# Patient Record
Sex: MALE | Race: BLACK OR AFRICAN AMERICAN
[De-identification: names, ages, dates, MRNs, and addresses within clinical notes are randomized per-mention and may not be internally consistent; named-entity substitution may affect disease eponyms.]

---

## 2017-06-19 ENCOUNTER — HOSPITAL ENCOUNTER (OUTPATIENT)
Dept: HOSPITAL 47 - RADNMMAIN | Age: 42
Discharge: HOME | End: 2017-06-19
Attending: PHYSICIAN ASSISTANT
Payer: COMMERCIAL

## 2017-06-19 DIAGNOSIS — R11.2: Primary | ICD-10-CM

## 2017-06-19 PROCEDURE — 78264 GASTRIC EMPTYING IMG STUDY: CPT

## 2017-06-19 NOTE — NM
EXAMINATION TYPE: NM gastric emptying study

 

DATE OF EXAM: 6/19/2017

 

COMPARISON: NONE

 

HISTORY: Nausea with vomiting per order. Additional symptoms of epigastric pain, diminished appetite,
 and heartburn and reflux-like symptoms per patient.

 

Following administration of 2.2 mCi Tc 99m Sulfur Colloid with 1 cup of oatmeal projection images of 
the abdomen were obtained 10 minutes post ingestion. When possible, both anterior and posterior proje
ction images were obtained to allow the calculation of the geometric mean activity.

 

Clearance: 79 % at 60 minutes

 

Half-life: 10 min 

 

Reflux screen:  None

 

 

IMPRESSION: No gastroparesis. Abnormally fast emptying is actually noted. 

 

Gastric emptying normal percentage values:

30 minutes: <70% of retention (> 30% emptying) suggests abnormally fast emptying.

60 minutes: <90% retention (>10% emptying) is normal; less than 30% retention (>70% emptying) suggest
s abnormally rapid emptying.

90 minutes: <65% retention (> 35% emptying) is normal.

120 minutes: <60% retention (> 40% emptying) is normal.

180 minutes: <30% retention (> 70% emptying) is normal.

 

Gastric emptying T-1/2:

Solid: The normal range is  minutes

Liquid only: Normal range is 10-45 minutes.

Liquid only-children: At 60 minutes, normal range is 44-58 % .

Liquid only-infants: At 60 minutes, normal range is 32-64 %.

 

Additional references:

Gastric Emptying Scintigraphy [email or web address]://[email or web address]

## 2018-04-04 ENCOUNTER — HOSPITAL ENCOUNTER (OUTPATIENT)
Dept: HOSPITAL 47 - RADUSWWP | Age: 43
Discharge: HOME | End: 2018-04-04
Payer: COMMERCIAL

## 2018-04-04 DIAGNOSIS — E87.1: Primary | ICD-10-CM

## 2018-04-04 PROCEDURE — 76770 US EXAM ABDO BACK WALL COMP: CPT

## 2018-04-04 NOTE — US
EXAMINATION TYPE: US kidneys/renal and bladder

 

DATE OF EXAM: 4/4/2018

 

COMPARISON: NONE

 

CLINICAL HISTORY: E87.1 Hypotremia.

 

EXAM MEASUREMENTS:

 

Right Kidney:  10.2 x 5.5 x 9.7 cm

Left Kidney: 9.7 x 5.1 x 4.8 cm

Post Void Residual Volume:  1.1 mL

 

Limited due to bowel gas

 

Right Kidney: appears wnl  

Left Kidney: appears wnl  

Bladder: ?thickened bladder wall

**Bilateral Jets seen: No

**Normal Post Void Residual: Yes

 

 

 

IMPRESSION:

 

1. No acute abnormality.

2. Possible thickening of the urinary bladder wall. This could be related to incomplete distention.

## 2018-04-05 ENCOUNTER — HOSPITAL ENCOUNTER (OUTPATIENT)
Dept: HOSPITAL 47 - ORWHC2ENDO | Age: 43
Discharge: HOME | End: 2018-04-05
Payer: COMMERCIAL

## 2018-04-05 VITALS — DIASTOLIC BLOOD PRESSURE: 65 MMHG | SYSTOLIC BLOOD PRESSURE: 111 MMHG | HEART RATE: 92 BPM

## 2018-04-05 VITALS — TEMPERATURE: 97.1 F

## 2018-04-05 VITALS — BODY MASS INDEX: 18 KG/M2

## 2018-04-05 VITALS — RESPIRATION RATE: 16 BRPM

## 2018-04-05 DIAGNOSIS — R56.9: ICD-10-CM

## 2018-04-05 DIAGNOSIS — D57.1: ICD-10-CM

## 2018-04-05 DIAGNOSIS — I10: ICD-10-CM

## 2018-04-05 DIAGNOSIS — M10.9: ICD-10-CM

## 2018-04-05 DIAGNOSIS — F41.9: ICD-10-CM

## 2018-04-05 DIAGNOSIS — Z79.891: ICD-10-CM

## 2018-04-05 DIAGNOSIS — K29.50: Primary | ICD-10-CM

## 2018-04-05 DIAGNOSIS — F32.9: ICD-10-CM

## 2018-04-05 DIAGNOSIS — E11.65: ICD-10-CM

## 2018-04-05 DIAGNOSIS — Z98.1: ICD-10-CM

## 2018-04-05 DIAGNOSIS — K44.9: ICD-10-CM

## 2018-04-05 DIAGNOSIS — F17.200: ICD-10-CM

## 2018-04-05 DIAGNOSIS — Z79.4: ICD-10-CM

## 2018-04-05 DIAGNOSIS — K21.9: ICD-10-CM

## 2018-04-05 DIAGNOSIS — F10.10: ICD-10-CM

## 2018-04-05 DIAGNOSIS — Z79.899: ICD-10-CM

## 2018-04-05 LAB
GLUCOSE BLD-MCNC: 116 MG/DL (ref 75–99)
GLUCOSE BLD-MCNC: 157 MG/DL (ref 75–99)
GLUCOSE BLD-MCNC: 29 MG/DL (ref 75–99)
GLUCOSE BLD-MCNC: 32 MG/DL (ref 75–99)

## 2018-04-05 PROCEDURE — 43239 EGD BIOPSY SINGLE/MULTIPLE: CPT

## 2018-04-05 PROCEDURE — 45380 COLONOSCOPY AND BIOPSY: CPT

## 2018-04-05 PROCEDURE — 88305 TISSUE EXAM BY PATHOLOGIST: CPT

## 2018-04-05 NOTE — P.PCN
Date of Procedure: 04/05/18


Procedure(s) Performed: 


Procedures: 1. Esophagogastroduodenoscopy and biopsy.  2. Colonoscopy and 

biopsy.





Preoperative diagnosis: Abdominal pain and change in bowel habits.





Postoperative diagnosis: 1. Small sliding hiatal hernia with no obvious 

esophagitis or complicated reflux disease.  2. Mild antral gastritis.  3. Poor 

colon preparation, otherwise, exam to the cecum showed no obvious 

abnormalities.  4. Biopsies obtained from the duodenum, antrum, esophagus and 

right colon.





Preparation: HalfLytely prep.





Sedation: Was provided by anesthesia.





Brief clinical history: The patient is a 42-year-old male who was evaluated in 

the office last month for abdominal pain and change in bowel habits.  His pain 

has been mostly in the epigastric area, intermittent for more than one year.  

It occurs after meals worse with larger or greasy food.  For the last 5 months 

he has been having 3-4 bowel movement daily and to him it appears oily.  A 72-

hour stool study for fecal fat was within normal limits.  The patient has been 

on omeprazole 20 mg daily.  He has no heartburn or passive regurgitation on 

this treatment but still have occasional nausea.  No more vomiting.  He reports 

feeling full quickly.  He was diagnosed with diabetes mellitus in July 2016 and 

lost 32 pounds in 6 months at that time and his weight has been stable for the 

last year.  His diabetes is not well controlled.  This evaluation is to assess 

for peptic ulcer disease complicated reflux disease or colonic pathology.





Procedure: With the patient on his left lateral decubitus position and after 

informed consent and adequate sedation, I passed the Olympus- video 

upper endoscope through the cricopharyngeus down the esophagus.  GE junction 

was around 40-41 cm from the incisors and there was a small sliding hiatal 

hernia but no obvious esophagitis or complicated reflux.  The endoscope was 

then passed into the stomach which was insufflated with air and inspected in 

detail including the retroflex view in the cardia.  There was some mottling and 

erythema in the antrum but no ulcers or erosions.  There were no phytobezoar or 

other evidence of gastroparesis. Pyloric channel, duodenal bulb, post bulbar 

area and descending duodenum appeared within normal limits.  Because of his 

symptoms, I obtained biopsies from the duodenum, antrum and esophagus then the 

endoscope was withdrawn and I proceeded with the colonoscopy.





Perianal area did not show any fissures or fistulas.  There were no masses felt 

on digital rectal examination.  The Olympus CFQ 160L video colonoscope was then 

inserted in the rectum in the usual fashion and advanced to the cecum.  

Unfortunately, the preparation was extremely poor.  I did not see any large 

polyps or tumors or any obstruction. Where visualized, the mucosa appeared 

healthy.  Because of his poor preparation I was not able to intubate the 

ileocecal valve.  I obtained biopsies from the right colon then the endoscope 

was withdrawn.





The patient tolerated the procedure well.





Plan I summarized the findings to the patient.  Will await biopsy results.  He 

will follow up in our office and with you as planned and we would keep you 

updated on his progress.

## 2018-04-06 ENCOUNTER — HOSPITAL ENCOUNTER (OUTPATIENT)
Dept: HOSPITAL 47 - RADMRIMAIN | Age: 43
Discharge: HOME | End: 2018-04-06
Payer: COMMERCIAL

## 2018-04-06 DIAGNOSIS — H74.8X2: Primary | ICD-10-CM

## 2018-04-06 PROCEDURE — 70553 MRI BRAIN STEM W/O & W/DYE: CPT

## 2018-04-06 NOTE — MR
EXAMINATION TYPE: MR brain wo/w con

 

DATE OF EXAM: 4/6/2018

 

COMPARISON: NONE

 

HISTORY: Tinnitus, left ear

 

CONTRAST:  Performed utilizing 5 mL intravenous Gadavist gadolinium contrast.  

 

TECHNIQUE: Multiplanar, multiecho imaging on a 3.0 Devi magnet is performed through the brain.  Stud
y is performed within 24 hours of arrival to the hospital.

 

The craniovertebral junction is normal.  The pituitary is normal.  

 

Diffusion-weighted imaging is performed.  No abnormal hyperintensity is present to suggest an acute i
ntracranial infarct or acute ischemic change.

 

There are couple punctate subcortical white matter changes near the left centrum semiovale vertex. Th
evan are nonspecific and not out of proportion to the patient age.

 

No abnormal enhancement is evident.

 

Ventricles and sulci are appropriate for the patient age.

There are fluid-filled left mastoid air cells. Correlate for acute left mastoiditis. Some mild mucosa
l thickening is within ethmoid air cells.

 

IMPRESSIONS:

1. Normal pre and postcontrast intracranial MRI brain.

2. Fluid within the left mastoid air cells. Correlate for acute left mastoiditis.

## 2018-04-24 ENCOUNTER — HOSPITAL ENCOUNTER (OUTPATIENT)
Dept: HOSPITAL 47 - RADCTMAIN | Age: 43
Discharge: HOME | End: 2018-04-24
Payer: COMMERCIAL

## 2018-04-24 DIAGNOSIS — R63.4: ICD-10-CM

## 2018-04-24 DIAGNOSIS — R19.5: ICD-10-CM

## 2018-04-24 DIAGNOSIS — M79.672: Primary | ICD-10-CM

## 2018-04-24 LAB — BUN SERPL-SCNC: 10 MG/DL (ref 9–20)

## 2018-04-24 PROCEDURE — 84520 ASSAY OF UREA NITROGEN: CPT

## 2018-04-24 PROCEDURE — 36415 COLL VENOUS BLD VENIPUNCTURE: CPT

## 2018-04-24 PROCEDURE — 74177 CT ABD & PELVIS W/CONTRAST: CPT

## 2018-04-24 PROCEDURE — 82565 ASSAY OF CREATININE: CPT

## 2018-04-24 PROCEDURE — 73630 X-RAY EXAM OF FOOT: CPT

## 2018-04-24 NOTE — CT
EXAMINATION TYPE: CT abdomen pelvis w con

 

DATE OF EXAM: 4/24/2018

 

COMPARISON: NONE

 

HISTORY: Abnormal weight loss per order and patient (R 63.4).

 

CT DLP: 351.10 mGycm, Automated Exposure Control for Dose Reduction was Utilized.

 

CONTRAST: 

CT scan of the abdomen and pelvis is performed with oral and with IV Contrast, patient injected with 
100 ml mL of Isovue 300.

 

FINDINGS: Patient has very little intra-abdominal fat making evaluation suboptimal.

 

LUNG BASES:  No significant abnormality is appreciated.

 

LIVER/GB:   No significant abnormality is appreciated.

 

PANCREAS: Pancreas is small in size with calcifications throughout its entire course. CT findings are
 consistent with product of chronic pancreatitis.

 

SPLEEN:  No significant abnormality is seen.

 

ADRENALS:  No significant abnormality is seen.

 

KIDNEYS: There is symmetric cortical medullary uptake and excretion from both kidneys without evidenc
e of concerning renal mass or hydronephrosis bilaterally. A few scattered pelvic phleboliths are pres
ent.

 

BOWEL: Oral contrast does not reach colonic level making evaluation slightly submitted optimal. There
 is no suspicious dilatation of stomach or small bowel loops. There is small bowel feces sign in the 
terminal ileum consistent with delayed passage of ingested material 2 colonic level. There is promine
nce of fecal material throughout the entire visualized colon into rectum.

 

PROSTATE/SEMINAL VESICLES:  No gross abnormality seen.

 

LYMPH NODES:  No greater than 1cm abdominal or pelvic lymph nodes are appreciated.

 

OSSEOUS STRUCTURES:  No significant abnormality is seen.

 

OTHER: There is mild to moderate calcified plaque of aorta extending into branch vessels.

 

IMPRESSION:

1. No worrisome mass or adenopathy is seen.

2. Overall nonobstructive bowel gas pattern. Fairly moderate to severe diffuse colonic fecal stasis o
r constipation is noted. CT findings consistent with chronic pancreatitis are present.

## 2018-04-24 NOTE — XR
EXAMINATION TYPE: XR foot complete LT

 

DATE OF EXAM: 4/24/2018

 

CLINICAL HISTORY: pain

 

TECHNIQUE: Frontal, lateral and oblique images of the left foot are obtained.

 

COMPARISON: None.

 

FINDINGS:  Bunionectomy changes first metatarsal. There is no acute fracture/dislocation evident. The
 joint spaces  appear within normal limits.  The overlying soft tissue appears unremarkable.

 

IMPRESSION:  

There is no acute fracture or dislocation.

 

ICD 10 NO FRACTURE, INITIAL EVALUATION

## 2019-01-14 ENCOUNTER — HOSPITAL ENCOUNTER (OUTPATIENT)
Dept: HOSPITAL 47 - EC | Age: 44
Setting detail: OBSERVATION
LOS: 2 days | Discharge: HOME | End: 2019-01-16
Attending: INTERNAL MEDICINE | Admitting: INTERNAL MEDICINE
Payer: COMMERCIAL

## 2019-01-14 VITALS — BODY MASS INDEX: 17.9 KG/M2

## 2019-01-14 DIAGNOSIS — Z98.1: ICD-10-CM

## 2019-01-14 DIAGNOSIS — E78.5: ICD-10-CM

## 2019-01-14 DIAGNOSIS — M10.9: ICD-10-CM

## 2019-01-14 DIAGNOSIS — K92.0: ICD-10-CM

## 2019-01-14 DIAGNOSIS — K56.7: Primary | ICD-10-CM

## 2019-01-14 DIAGNOSIS — Z96.41: ICD-10-CM

## 2019-01-14 DIAGNOSIS — E10.42: ICD-10-CM

## 2019-01-14 DIAGNOSIS — Z87.19: ICD-10-CM

## 2019-01-14 DIAGNOSIS — K76.0: ICD-10-CM

## 2019-01-14 DIAGNOSIS — E87.1: ICD-10-CM

## 2019-01-14 DIAGNOSIS — Z23: ICD-10-CM

## 2019-01-14 DIAGNOSIS — K21.9: ICD-10-CM

## 2019-01-14 DIAGNOSIS — D72.829: ICD-10-CM

## 2019-01-14 DIAGNOSIS — G40.909: ICD-10-CM

## 2019-01-14 DIAGNOSIS — Z79.1: ICD-10-CM

## 2019-01-14 DIAGNOSIS — M54.5: ICD-10-CM

## 2019-01-14 DIAGNOSIS — I10: ICD-10-CM

## 2019-01-14 DIAGNOSIS — Z86.61: ICD-10-CM

## 2019-01-14 DIAGNOSIS — Z72.89: ICD-10-CM

## 2019-01-14 DIAGNOSIS — D57.3: ICD-10-CM

## 2019-01-14 DIAGNOSIS — Z79.4: ICD-10-CM

## 2019-01-14 DIAGNOSIS — Z82.49: ICD-10-CM

## 2019-01-14 DIAGNOSIS — K86.1: ICD-10-CM

## 2019-01-14 DIAGNOSIS — Z79.899: ICD-10-CM

## 2019-01-14 DIAGNOSIS — E86.0: ICD-10-CM

## 2019-01-14 DIAGNOSIS — F17.210: ICD-10-CM

## 2019-01-14 DIAGNOSIS — R19.5: ICD-10-CM

## 2019-01-14 PROCEDURE — 85025 COMPLETE CBC W/AUTO DIFF WBC: CPT

## 2019-01-14 PROCEDURE — 74177 CT ABD & PELVIS W/CONTRAST: CPT

## 2019-01-14 PROCEDURE — 74018 RADEX ABDOMEN 1 VIEW: CPT

## 2019-01-14 PROCEDURE — 96374 THER/PROPH/DIAG INJ IV PUSH: CPT

## 2019-01-14 PROCEDURE — 82553 CREATINE MB FRACTION: CPT

## 2019-01-14 PROCEDURE — 99285 EMERGENCY DEPT VISIT HI MDM: CPT

## 2019-01-14 PROCEDURE — 83690 ASSAY OF LIPASE: CPT

## 2019-01-14 PROCEDURE — 36415 COLL VENOUS BLD VENIPUNCTURE: CPT

## 2019-01-14 PROCEDURE — 84484 ASSAY OF TROPONIN QUANT: CPT

## 2019-01-14 PROCEDURE — 85610 PROTHROMBIN TIME: CPT

## 2019-01-14 PROCEDURE — 82272 OCCULT BLD FECES 1-3 TESTS: CPT

## 2019-01-14 PROCEDURE — 82550 ASSAY OF CK (CPK): CPT

## 2019-01-14 PROCEDURE — 90686 IIV4 VACC NO PRSV 0.5 ML IM: CPT

## 2019-01-14 PROCEDURE — 82150 ASSAY OF AMYLASE: CPT

## 2019-01-14 PROCEDURE — 96361 HYDRATE IV INFUSION ADD-ON: CPT

## 2019-01-14 PROCEDURE — 81003 URINALYSIS AUTO W/O SCOPE: CPT

## 2019-01-14 PROCEDURE — 96376 TX/PRO/DX INJ SAME DRUG ADON: CPT

## 2019-01-14 PROCEDURE — 80053 COMPREHEN METABOLIC PANEL: CPT

## 2019-01-14 PROCEDURE — 86901 BLOOD TYPING SEROLOGIC RH(D): CPT

## 2019-01-14 PROCEDURE — 86900 BLOOD TYPING SEROLOGIC ABO: CPT

## 2019-01-14 PROCEDURE — 83605 ASSAY OF LACTIC ACID: CPT

## 2019-01-14 PROCEDURE — 83036 HEMOGLOBIN GLYCOSYLATED A1C: CPT

## 2019-01-14 PROCEDURE — 85730 THROMBOPLASTIN TIME PARTIAL: CPT

## 2019-01-14 PROCEDURE — 90732 PPSV23 VACC 2 YRS+ SUBQ/IM: CPT

## 2019-01-14 PROCEDURE — 86850 RBC ANTIBODY SCREEN: CPT

## 2019-01-14 PROCEDURE — 96375 TX/PRO/DX INJ NEW DRUG ADDON: CPT

## 2019-01-15 LAB
ALBUMIN SERPL-MCNC: 5.3 G/DL (ref 3.5–5)
ALP SERPL-CCNC: 113 U/L (ref 38–126)
ALT SERPL-CCNC: 74 U/L (ref 21–72)
AMYLASE SERPL-CCNC: 81 U/L (ref 30–110)
ANION GAP SERPL CALC-SCNC: 18 MMOL/L
APTT BLD: 22.3 SEC (ref 22–30)
AST SERPL-CCNC: 105 U/L (ref 17–59)
BASOPHILS # BLD AUTO: 0.1 K/UL (ref 0–0.2)
BASOPHILS NFR BLD AUTO: 1 %
BUN SERPL-SCNC: 22 MG/DL (ref 9–20)
CALCIUM SPEC-MCNC: 11 MG/DL (ref 8.4–10.2)
CHLORIDE SERPL-SCNC: 92 MMOL/L (ref 98–107)
CK SERPL-CCNC: 56 U/L (ref 55–170)
CO2 SERPL-SCNC: 25 MMOL/L (ref 22–30)
EOSINOPHIL # BLD AUTO: 0.4 K/UL (ref 0–0.7)
EOSINOPHIL NFR BLD AUTO: 4 %
ERYTHROCYTE [DISTWIDTH] IN BLOOD BY AUTOMATED COUNT: 4.24 M/UL (ref 4.3–5.9)
ERYTHROCYTE [DISTWIDTH] IN BLOOD: 12.8 % (ref 11.5–15.5)
GLUCOSE BLD-MCNC: 156 MG/DL (ref 75–99)
GLUCOSE BLD-MCNC: 171 MG/DL (ref 75–99)
GLUCOSE BLD-MCNC: 172 MG/DL (ref 75–99)
GLUCOSE SERPL-MCNC: 215 MG/DL (ref 74–99)
HCT VFR BLD AUTO: 38.4 % (ref 39–53)
HGB BLD-MCNC: 12.6 GM/DL (ref 13–17.5)
INR PPP: 1.1 (ref ?–1.2)
LIPASE SERPL-CCNC: 10 U/L (ref 23–300)
LYMPHOCYTES # SPEC AUTO: 3.7 K/UL (ref 1–4.8)
LYMPHOCYTES NFR SPEC AUTO: 33 %
MCH RBC QN AUTO: 29.7 PG (ref 25–35)
MCHC RBC AUTO-ENTMCNC: 32.8 G/DL (ref 31–37)
MCV RBC AUTO: 90.5 FL (ref 80–100)
MONOCYTES # BLD AUTO: 0.8 K/UL (ref 0–1)
MONOCYTES NFR BLD AUTO: 7 %
NEUTROPHILS # BLD AUTO: 6.1 K/UL (ref 1.3–7.7)
NEUTROPHILS NFR BLD AUTO: 54 %
PH UR: 5 [PH] (ref 5–8)
PLATELET # BLD AUTO: 193 K/UL (ref 150–450)
POTASSIUM SERPL-SCNC: 4 MMOL/L (ref 3.5–5.1)
PROT SERPL-MCNC: 9.1 G/DL (ref 6.3–8.2)
PT BLD: 11.3 SEC (ref 9–12)
SODIUM SERPL-SCNC: 135 MMOL/L (ref 137–145)
SP GR UR: 1.01 (ref 1–1.03)
TROPONIN I SERPL-MCNC: <0.012 NG/ML (ref 0–0.03)
UROBILINOGEN UR QL STRIP: <2 MG/DL (ref ?–2)
WBC # BLD AUTO: 11.4 K/UL (ref 3.8–10.6)

## 2019-01-15 RX ADMIN — FAMOTIDINE SCH MG: 20 TABLET, FILM COATED ORAL at 11:17

## 2019-01-15 RX ADMIN — FAMOTIDINE SCH MG: 20 TABLET, FILM COATED ORAL at 22:20

## 2019-01-15 RX ADMIN — MORPHINE SULFATE PRN MG: 4 INJECTION, SOLUTION INTRAMUSCULAR; INTRAVENOUS at 11:16

## 2019-01-15 RX ADMIN — CEFAZOLIN SCH MLS/HR: 330 INJECTION, POWDER, FOR SOLUTION INTRAMUSCULAR; INTRAVENOUS at 14:19

## 2019-01-15 RX ADMIN — LISINOPRIL SCH MG: 10 TABLET ORAL at 11:21

## 2019-01-15 RX ADMIN — MORPHINE SULFATE PRN MG: 4 INJECTION, SOLUTION INTRAMUSCULAR; INTRAVENOUS at 22:21

## 2019-01-15 NOTE — CT
EXAMINATION TYPE: CT abdomen pelvis w con

 

DATE OF EXAM: 1/15/2019

 

COMPARISON: 4/24/2018

 

HISTORY: abd pain

 

CT DLP: 416.7 mGycm

Automated exposure control for dose reduction was used.

 

TECHNIQUE:  Helical acquisition of images was performed from the lung bases through the pelvis.

 

CONTRAST: 

Performed without Oral Contrast and with IV Contrast, patient injected with 100 mL of Isovue 300.

 

FINDINGS: 

 

Lung bases are clear. There is no pleural effusion. Heart appears normal. There is no pericardial eff
usion. There is small hiatal hernia. Liver shows no focal defect. Gallbladder appears normal. Bile du
cts are not dilated. There is no evidence of splenic mass. There is extensive carotid calcification. 
There is no evidence of pancreatic mass.

 

There is no adrenal mass. Kidneys show satisfactory contrast opacification. There is no hydronephrosi
s. There is no retroperitoneal adenopathy. There is no free fluid in the pelvis. Bladder distends smo
othly. There is no inguinal hernia. There is no sign of a pelvic mass. Abdominal aorta is atheromatou
s. There are some fluid-filled loops of small bowel that measure up to 3 cm. There is no evidence of 
mesenteric edema. There is no sign of free air. I see no intestinal wall thickening. Appendix is not 
seen. There is no sign of appendicitis.

LUMBAR VERTEBRA HAVE NORMAL SPACING AND ALIGNMENT. THERE IS NO COMPRESSION FRACTURE.

 

IMPRESSION:

THERE ARE A FEW SMALL BOWEL LOOPS DISTENDED WITH FLUID IN THE LEFT UPPER QUADRANT CONSISTENT WITH ILE
US. I SEE NO TRANSITION POINT. THIS APPEARS NEW COMPARED TO OLD EXAM. THERE IS CLEARING OF THE CONSTI
PATION COMPARED TO OLD EXAM.

 

EXTENSIVE PANCREATIC CALCIFICATION CONSISTENT WITH CHRONIC PANCREATITIS UNCHANGED.

## 2019-01-15 NOTE — P.GSCN
History of Present Illness


Consult date: 01/15/19


Reason for Consult: 





abdominal pain





Requesting physician: Raciel E Jonathan


History of present illness: 





CHIEF COMPLAINT: Abdominal pain.  Ileus





HISTORY OF PRESENT ILLNESS: 43-year-old -American male who presented to 

the emergency room due to abdominal pain.  Reports the pain was near the 

epigastric region and became so severe he presented to the ER. However, at the 

time of exam patient denies any pain. States he feels well and wants something 

to eat. Patient reports black stools over the past few days, but also reports 

taking Pepto Bismol. He currently denies nausea or vomiting. 





IMAGIN. CT Abdomen and pelvis completed in the emergency room reveals a few small 

bowel loops distended with fluid in the left upper quadrant consistent with 

ileus.  This appears new compared to old exam.  There is clearing of the 

constipation compared to old exam.  Extensive pancreatic calcification 

consistent with chronic pancreatitis unchanged.


2. KUB X-Ray: Nonacute abdomen





PAST MEDICAL HISTORY: 


See list.





PAST SURGICAL HISTORY: 


See list.





MEDICATIONS: 


See list.





ALLERGIES: 


See list.





SOCIAL HISTORY: Reports occasional marijuana use.  Daily cigarette smoker.  

Daily alcohol use.





REVIEW OF ORGAN SYSTEMS: 


CONSTITUTIONAL: Denies fever or chills.


HEENT: No troubles with vision or hearing. No reports of dysphagia.  


ENDOCRINE: No reports of thyroid disorders. Reports history of diabetes.


CARDIOVASCULAR: No heart attack. No chest pain. 


RESPIRATORY: No shortness of breath or pneumonia.


GASTROINTESTINAL: Reports abdominal pain prior to admission.  Reports black 

stools.


NEURO: No reports of stroke or seizure disorders. 


PSYCH: No depression or suicidal ideation


HEMATOLOGIC: No easy bruising or bleeding


LYMPHATIC:  The patient denies any lumps and bumps around the neck. 


GENITOURINARY:  Denies any blood in urine or increased urinary frequency.  


MUSCULOSKELETAL:  Denies back pain, stiffness or joint arthritis. 


SKIN: Denies history or rash or cellulitis.





PHYSICAL EXAM: 


VITAL SIGNS: Currently stable.


GENERAL: Well-developed in no acute distress. 


HEENT:  No sclera icterus. Extraocular movements grossly intact.  Moist buccal 

mucosa. 


Head is atraumatic, normocephalic. Hears conversational speech. No nasal 

drainage.


NECK:  Supple without lymphadenopathy.


CHEST:  Non-labored respirations and equal bilateral excursions. 


CARDIOVASCULAR:  Regular rate with regular rhythm.  Palpable 2+ radial pulses.


ABDOMEN:  Soft.  Nondistended.  No peritonitis. Mild epigastric tenderness upon 

palpation. 


MUSCULOSKELETAL:  No clubbing, cyanosis or edema.


NEUROLOGIC:  No focal or lateralizing signs.  Cranial nerves II through XII 

grossly intact.


PSYCH: Alert and oriented x  3


SKIN: Well perfused.  Good skin turgor. 





ASSESSMENT: 


1. Abdominal pain, CT reveals a few small bowel loops distended with fluid in 

the left upper quadrant consistent with ileus


2. Ileus


3. Dark stools, patient reports taking pepto bismol at home, stool for occult 

blood negative, r/o GI bleeding


4. Chronic pancreatitis


5. Daily alcohol use


6. Leukocytosis


7. Transaminitis





PLAN: 


May begin clear liquid diet. No surgical intervention at this time. 





Nurse practitioner note has been reviewed by physician. Signing provider agrees 

with the documented findings, assessment, and plan of care. 











Past Medical History


Past Medical History: Diabetes Mellitus, GERD/Reflux, Hyperlipidemia, 

Hypertension, Liver Disease, Seizure Disorder


Additional Past Medical History / Comment(s): IDDM type I with insulin pump, DKA

, neuropathy bilateral feet, metabolic encephalitis post seizure, seizures-last 

one , fatty liver, occasional low back pain, gastritis, hiatal hernina, 

abdominal pain/vomiting and diarrhea at times, gout bilateral feet.


History of Any Multi-Drug Resistant Organisms: None Reported


Past Surgical History: Back Surgery


Additional Past Surgical History / Comment(s): EGDs/colonoscopies, low back 

spinal fusion, L toes with screws/bunionectomy.


Past Anesthesia/Blood Transfusion Reactions: Motion Sickness


Smoking Status: Current every day smoker





- Past Family History


  ** Father


Family Medical History: Unable to Obtain


Additional Family Medical History / Comment(s): Father was murdered in his 30's





  ** Mother


Family Medical History: No Reported History, Hypertension


Additional Family Medical History / Comment(s): Mother is 64 yrs old.  Mother 

is 64 yrs old





  ** Brother(s)


Family Medical History: Hypertension





  ** Sister(s)


Family Medical History: Hypertension





Medications and Allergies


 Home Medications











 Medication  Instructions  Recorded  Confirmed  Type


 


Folic Acid 1 mg PO DAILY 07/16/14 01/15/19 History


 


Lisinopril [Zestril] 10 mg PO DAILY 07/16/14 01/15/19 History


 


Thiamine [Vitamin B-1] 100 mg PO DAILY 07/16/14 01/15/19 History


 


Omeprazole [PriLOSEC] 20 mg PO AC-BRKFST 12/10/15 01/15/19 History


 


Hydrocodone/Acetaminophen [Norco 1 tab PO BID PRN 06/29/16 01/15/19 History





7.5-325]    


 


Lacosamide [Vimpat] 100 mg PO BID 06/29/16 01/15/19 History


 


Potassium Chloride ER [K-Dur 10] 10 meq PO DAILY 06/29/16 01/15/19 History


 


levETIRAcetam [Keppra] 500 mg PO BID 08/22/16 01/15/19 History


 


Magnesium Oxide [Mag-Ox] 400 mg PO BID 04/03/18 01/15/19 History


 


PARoxetine HCL [Paxil] 30 mg PO DAILY 04/03/18 01/15/19 History


 


Atorvastatin [Lipitor] 10 mg PO DAILY 01/15/19 01/15/19 History


 


INSULIN LISPRO (For Pump) [humaLOG 0.01 units SQ-PUMP CONTINUOUS 01/15/19 01/15/

19 History





(For Pump)]    


 


Lipase/Protease/Amylase [Creon Dr 24,000 units PO TID 01/15/19 01/15/19 History





12,000 Units Capsule]    


 


QUEtiapine [SEROquel] 100 mg PO HS 01/15/19 01/15/19 History


 


Vitamin D2  20,000 Units 20,000 units PO MO 01/15/19 01/15/19 History











 Allergies











Allergy/AdvReac Type Severity Reaction Status Date / Time


 


No Known Allergies Allergy   Verified 01/15/19 06:44














Surgical - Exam


 Vital Signs











Temp Pulse Resp BP Pulse Ox


 


 97.7 F   104 H  18   102/68   99 


 


 19 23:29  19 23:29  19 23:29  19 23:29  19 23:29














Results





- Labs





 01/15/19 00:26





 01/15/19 00:26


 Abnormal Lab Results - Last 24 Hours (Table)











  01/15/19 01/15/19 01/15/19 Range/Units





  00:26 00:26 00:26 


 


WBC   11.4 H   (3.8-10.6)  k/uL


 


RBC   4.24 L   (4.30-5.90)  m/uL


 


Hgb   12.6 L   (13.0-17.5)  gm/dL


 


Hct   38.4 L   (39.0-53.0)  %


 


Sodium  135 L    (137-145)  mmol/L


 


Chloride  92 L    ()  mmol/L


 


BUN  22 H    (9-20)  mg/dL


 


Glucose  215 H    (74-99)  mg/dL


 


POC Glucose (mg/dL)     (75-99)  mg/dL


 


Plasma Lactic Acid Jean-Claude    4.7 H*  (0.7-2.0)  mmol/L


 


Calcium  11.0 H    (8.4-10.2)  mg/dL


 


AST  105 H    (17-59)  U/L


 


ALT  74 H    (21-72)  U/L


 


Total Protein  9.1 H    (6.3-8.2)  g/dL


 


Albumin  5.3 H    (3.5-5.0)  g/dL


 


Lipase  10 L    ()  U/L














  01/15/19 Range/Units





  12:29 


 


WBC   (3.8-10.6)  k/uL


 


RBC   (4.30-5.90)  m/uL


 


Hgb   (13.0-17.5)  gm/dL


 


Hct   (39.0-53.0)  %


 


Sodium   (137-145)  mmol/L


 


Chloride   ()  mmol/L


 


BUN   (9-20)  mg/dL


 


Glucose   (74-99)  mg/dL


 


POC Glucose (mg/dL)  156 H  (75-99)  mg/dL


 


Plasma Lactic Acid Jean-Claude   (0.7-2.0)  mmol/L


 


Calcium   (8.4-10.2)  mg/dL


 


AST   (17-59)  U/L


 


ALT   (21-72)  U/L


 


Total Protein   (6.3-8.2)  g/dL


 


Albumin   (3.5-5.0)  g/dL


 


Lipase   ()  U/L








 Diabetes panel











  01/15/19 Range/Units





  00:26 


 


Sodium  135 L  (137-145)  mmol/L


 


Potassium  4.0  (3.5-5.1)  mmol/L


 


Chloride  92 L  ()  mmol/L


 


Carbon Dioxide  25  (22-30)  mmol/L


 


BUN  22 H  (9-20)  mg/dL


 


Creatinine  1.18  (0.66-1.25)  mg/dL


 


Glucose  215 H  (74-99)  mg/dL


 


Calcium  11.0 H  (8.4-10.2)  mg/dL


 


AST  105 H  (17-59)  U/L


 


ALT  74 H  (21-72)  U/L


 


Alkaline Phosphatase  113  ()  U/L


 


Total Protein  9.1 H  (6.3-8.2)  g/dL


 


Albumin  5.3 H  (3.5-5.0)  g/dL








 Calcium panel











  01/15/19 Range/Units





  00:26 


 


Calcium  11.0 H  (8.4-10.2)  mg/dL


 


Albumin  5.3 H  (3.5-5.0)  g/dL








 Pituitary panel











  01/15/19 Range/Units





  00:26 


 


Sodium  135 L  (137-145)  mmol/L


 


Potassium  4.0  (3.5-5.1)  mmol/L


 


Chloride  92 L  ()  mmol/L


 


Carbon Dioxide  25  (22-30)  mmol/L


 


BUN  22 H  (9-20)  mg/dL


 


Creatinine  1.18  (0.66-1.25)  mg/dL


 


Glucose  215 H  (74-99)  mg/dL


 


Calcium  11.0 H  (8.4-10.2)  mg/dL








 Adrenal panel











  01/15/19 Range/Units





  00:26 


 


Sodium  135 L  (137-145)  mmol/L


 


Potassium  4.0  (3.5-5.1)  mmol/L


 


Chloride  92 L  ()  mmol/L


 


Carbon Dioxide  25  (22-30)  mmol/L


 


BUN  22 H  (9-20)  mg/dL


 


Creatinine  1.18  (0.66-1.25)  mg/dL


 


Glucose  215 H  (74-99)  mg/dL


 


Calcium  11.0 H  (8.4-10.2)  mg/dL


 


Total Bilirubin  0.8  (0.2-1.3)  mg/dL


 


AST  105 H  (17-59)  U/L


 


ALT  74 H  (21-72)  U/L


 


Alkaline Phosphatase  113  ()  U/L


 


Total Protein  9.1 H  (6.3-8.2)  g/dL


 


Albumin  5.3 H  (3.5-5.0)  g/dL

## 2019-01-15 NOTE — ED
Abdominal Pain HPI





<Daksha Small CDE - Last Filed: 01/15/19 02:32>





- General


Source: patient, RN notes reviewed


Mode of arrival: ambulatory


Limitations: no limitations





- History of Present Illness


MD Complaint: abdominal pain, other





<Alex Dumas - Last Filed: 01/16/19 17:25>





- General


Chief Complaint: Abdominal Pain


Stated Complaint: Vomiting, Abd Pain


Time Seen by Provider: 01/14/19 23:33





- History of Present Illness


Initial Comments: 





This is a 43-year-old male who states he's had the onset of upper epigastric 

pain with coffee-ground emesis and black colored stools over last at least 

couple days.  He does admit that he had been taking Pepto-Bismol but he states 

coffee-ground emesis was both before and after taking Pepto-Bismol.  He states 

the pain is 10/10 achy in nature he's had no fevers chills nausea vomiting 

sweats he does have a history of fatty liver cancer pancreatic issues.  No 

prior abdominal surgeries he denies any recent alcohol a history of ulcers in 

the past a trauma any other symptoms at this time.  No prior history of GI 

bleed. (Alex Dumas)





- Related Data


 Home Medications











 Medication  Instructions  Recorded  Confirmed


 


Folic Acid 1 mg PO DAILY 07/16/14 01/15/19


 


Lisinopril [Zestril] 10 mg PO DAILY 07/16/14 01/15/19


 


Thiamine [Vitamin B-1] 100 mg PO DAILY 07/16/14 01/15/19


 


Hydrocodone/Acetaminophen [Norco 1 tab PO BID PRN 06/29/16 01/15/19





7.5-325]   


 


Lacosamide [Vimpat] 100 mg PO BID 06/29/16 01/15/19


 


Potassium Chloride ER [K-Dur 10] 10 meq PO DAILY 06/29/16 01/15/19


 


levETIRAcetam [Keppra] 500 mg PO BID 08/22/16 01/15/19


 


Magnesium Oxide [Mag-Ox] 400 mg PO BID 04/03/18 01/15/19


 


PARoxetine HCL [Paxil] 30 mg PO DAILY 04/03/18 01/15/19


 


Atorvastatin [Lipitor] 10 mg PO DAILY 01/15/19 01/15/19


 


INSULIN LISPRO (For Pump) [humaLOG 0.01 units SQ-PUMP CONTINUOUS 01/15/19 01/15/

19





(For Pump)]   


 


Lipase/Protease/Amylase [Creon Dr 24,000 units PO TID 01/15/19 01/15/19





12,000 Units Capsule]   


 


QUEtiapine [SEROquel] 100 mg PO HS 01/15/19 01/15/19


 


Vitamin D2  20,000 Units 20,000 units PO MO 01/15/19 01/15/19








 Previous Rx's











 Medication  Instructions  Recorded


 


Mirtazapine [Remeron] 45 mg PO HS  tablet 01/16/19


 


Pantoprazole Sodium [Protonix] 40 mg PO DAILY #15 tablet. 01/16/19











 Allergies











Allergy/AdvReac Type Severity Reaction Status Date / Time


 


No Known Allergies Allergy   Verified 01/15/19 06:44














Review of Systems


ROS Other: All systems not noted in ROS Statement are negative.





<Daksha Small - Last Filed: 01/15/19 02:32>


ROS Other: All systems not noted in ROS Statement are negative.





<Alex Dumas - Last Filed: 01/16/19 17:25>


ROS Statement: 


Those systems with pertinent positive or pertinent negative responses have been 

documented in the HPI.








Past Medical History


Past Medical History: Diabetes Mellitus, GERD/Reflux, Hyperlipidemia, 

Hypertension, Seizure Disorder


Additional Past Medical History / Comment(s): past metabolic encephalopathy 

from seizure disorder, recurrent seizure disorder r/t missed meds, chronic 

alcohol usage, gout, hypoalbuminemia from alcoholism, HTN.  Other hx:  anemia 

with sickle cell "trait" per significant other, abdominal pain with vomiting 

and diarrhea over past 6 months-was to have upper and lower endoscopies but pt 

cancelled.ringing in his ears


History of Any Multi-Drug Resistant Organisms: None Reported


Past Surgical History: Back Surgery


Additional Past Surgical History / Comment(s): 2014 egd with bx, left toe 

surgery with 2 screws


Past Anesthesia/Blood Transfusion Reactions: No Reported Reaction


Past Psychological History: Anxiety, Depression


Smoking Status: Current every day smoker


Past Alcohol Use History: Occasional


Past Drug Use History: Marijuana





- Past Family History


  ** Father


Family Medical History: Unable to Obtain


Additional Family Medical History / Comment(s): Father was murdered in his 30's





  ** Mother


Family Medical History: No Reported History, Hypertension


Additional Family Medical History / Comment(s): Mother is 64 yrs old





  ** Brother(s)


Family Medical History: Hypertension





  ** Sister(s)


Family Medical History: Hypertension





<Alex Dumas - Last Filed: 01/16/19 17:25>





General Exam





<Daksha Small P - Last Filed: 01/15/19 02:32>


Limitations: no limitations


General appearance: alert, in no apparent distress


Head exam: Present: atraumatic, normocephalic, normal inspection


Eye exam: Present: normal appearance, PERRL, EOMI.  Absent: scleral icterus, 

conjunctival injection, periorbital swelling


ENT exam: Present: normal exam, mucous membranes moist


Neck exam: Present: normal inspection.  Absent: tenderness, meningismus, 

lymphadenopathy


Respiratory exam: Present: normal lung sounds bilaterally.  Absent: respiratory 

distress, wheezes, rales, rhonchi, stridor


Cardiovascular Exam: Present: regular rate, normal rhythm, normal heart sounds.

  Absent: systolic murmur, diastolic murmur, rubs, gallop, clicks


GI/Abdominal exam: Present: soft, tenderness (Tenderness palpation of the 

epigastrium no guarding rebound masses or bruits), normal bowel sounds.  Absent

: distended, guarding, rebound, rigid


Rectal exam: Present: normal inspection, other (Dark greenish black stool 

Hemoccult is pending)


Extremities exam: Present: normal inspection, full ROM, normal capillary 

refill.  Absent: tenderness, pedal edema, joint swelling, calf tenderness


Back exam: Present: normal inspection


Neurological exam: Present: alert, oriented X3, CN II-XII intact


Psychiatric exam: Present: normal affect, normal mood


Skin exam: Present: warm, dry, intact, normal color.  Absent: rash





<Alex Dumas - Last Filed: 01/16/19 17:25>





- General Exam Comments


Initial Comments: 





This a well-developed sec appearing male who is awake alert oriented 3 (Alex Dumas)





Course





<Daksha Small - Last Filed: 01/15/19 02:32>





<Alex Dumas - Last Filed: 01/16/19 17:25>


 Vital Signs











  01/14/19 01/15/19 01/15/19





  23:29 02:29 06:36


 


Temperature 97.7 F  


 


Pulse Rate 104 H 72 78


 


Respiratory 18 16 16





Rate   


 


Blood Pressure 102/68 106/66 100/71


 


O2 Sat by Pulse 99 100 100





Oximetry   














- Reevaluation(s)


Reevaluation #1: 





01/15/19 01:00


The case is endorsed to Dr. Small at shift change (Alex Dumas)





Medical Decision Making





- Lab Data


Result diagrams: 


 01/15/19 00:26





 01/15/19 00:26





<Daksha Small - Last Filed: 01/15/19 02:32>





- Lab Data


Result diagrams: 


 01/16/19 09:35





 01/16/19 09:35





<Alex Dumas - Last Filed: 01/16/19 17:25>





- Medical Decision Making


Patient care was signed out to me by Dr. Dumas at shift change.  At that time 

labs are pending.  Patient's labs resulted with a critical lactic acidosis of 

4.7.  Additional IV fluid rehydration was ordered as well as a computed 

tomography scan of the abdomen


Computed tomography scan revealed dilated loops of bowel consistent with an 

ileus.  No other acute findings were noted.  At this time I'll plan to admit 

the patient for abdominal pain, lactic acidosis and need for rehydration.


 (Daksha Small)





- Lab Data


 Lab Results











  01/15/19 01/15/19 01/15/19 Range/Units





  00:26 00:26 00:26 


 


WBC    11.4 H  (3.8-10.6)  k/uL


 


RBC    4.24 L  (4.30-5.90)  m/uL


 


Hgb    12.6 L  (13.0-17.5)  gm/dL


 


Hct    38.4 L  (39.0-53.0)  %


 


MCV    90.5  (80.0-100.0)  fL


 


MCH    29.7  (25.0-35.0)  pg


 


MCHC    32.8  (31.0-37.0)  g/dL


 


RDW    12.8  (11.5-15.5)  %


 


Plt Count    193  (150-450)  k/uL


 


Neutrophils %    54  %


 


Lymphocytes %    33  %


 


Monocytes %    7  %


 


Eosinophils %    4  %


 


Basophils %    1  %


 


Neutrophils #    6.1  (1.3-7.7)  k/uL


 


Lymphocytes #    3.7  (1.0-4.8)  k/uL


 


Monocytes #    0.8  (0-1.0)  k/uL


 


Eosinophils #    0.4  (0-0.7)  k/uL


 


Basophils #    0.1  (0-0.2)  k/uL


 


PT     (9.0-12.0)  sec


 


INR     (<1.2)  


 


APTT     (22.0-30.0)  sec


 


Sodium  135 L    (137-145)  mmol/L


 


Potassium  4.0    (3.5-5.1)  mmol/L


 


Chloride  92 L    ()  mmol/L


 


Carbon Dioxide  25    (22-30)  mmol/L


 


Anion Gap  18    mmol/L


 


BUN  22 H    (9-20)  mg/dL


 


Creatinine  1.18    (0.66-1.25)  mg/dL


 


Est GFR (CKD-EPI)AfAm  86    (>60 ml/min/1.73 sqM)  


 


Est GFR (CKD-EPI)NonAf  75    (>60 ml/min/1.73 sqM)  


 


Glucose  215 H    (74-99)  mg/dL


 


Lactic Ac Sepsis Rflx     


 


Plasma Lactic Acid Jean-Claude     (0.7-2.0)  mmol/L


 


Calcium  11.0 H    (8.4-10.2)  mg/dL


 


Total Bilirubin  0.8    (0.2-1.3)  mg/dL


 


AST  105 H    (17-59)  U/L


 


ALT  74 H    (21-72)  U/L


 


Alkaline Phosphatase  113    ()  U/L


 


Total Creatine Kinase   56   ()  U/L


 


CK-MB (CK-2)   1.2   (0.0-2.4)  ng/mL


 


CK-MB (CK-2) Rel Index   2.1   


 


Troponin I   <0.012   (0.000-0.034)  ng/mL


 


Total Protein  9.1 H    (6.3-8.2)  g/dL


 


Albumin  5.3 H    (3.5-5.0)  g/dL


 


Amylase  81    ()  U/L


 


Lipase  10 L    ()  U/L


 


Urine Color     


 


Urine Appearance     (Clear)  


 


Urine pH     (5.0-8.0)  


 


Ur Specific Gravity     (1.001-1.035)  


 


Urine Protein     (Negative)  


 


Urine Glucose (UA)     (Negative)  


 


Urine Ketones     (Negative)  


 


Urine Blood     (Negative)  


 


Urine Nitrite     (Negative)  


 


Urine Bilirubin     (Negative)  


 


Urine Urobilinogen     (<2.0)  mg/dL


 


Ur Leukocyte Esterase     (Negative)  


 


Stool Occult Blood     (Negative)  


 


Blood Type     


 


Blood Type Confirm     


 


Blood Type Recheck     


 


Antibody Screen     


 


Spec Expiration Date     














  01/15/19 01/15/19 01/15/19 Range/Units





  00:26 00:26 00:26 


 


WBC     (3.8-10.6)  k/uL


 


RBC     (4.30-5.90)  m/uL


 


Hgb     (13.0-17.5)  gm/dL


 


Hct     (39.0-53.0)  %


 


MCV     (80.0-100.0)  fL


 


MCH     (25.0-35.0)  pg


 


MCHC     (31.0-37.0)  g/dL


 


RDW     (11.5-15.5)  %


 


Plt Count     (150-450)  k/uL


 


Neutrophils %     %


 


Lymphocytes %     %


 


Monocytes %     %


 


Eosinophils %     %


 


Basophils %     %


 


Neutrophils #     (1.3-7.7)  k/uL


 


Lymphocytes #     (1.0-4.8)  k/uL


 


Monocytes #     (0-1.0)  k/uL


 


Eosinophils #     (0-0.7)  k/uL


 


Basophils #     (0-0.2)  k/uL


 


PT   11.3   (9.0-12.0)  sec


 


INR   1.1   (<1.2)  


 


APTT   22.3   (22.0-30.0)  sec


 


Sodium     (137-145)  mmol/L


 


Potassium     (3.5-5.1)  mmol/L


 


Chloride     ()  mmol/L


 


Carbon Dioxide     (22-30)  mmol/L


 


Anion Gap     mmol/L


 


BUN     (9-20)  mg/dL


 


Creatinine     (0.66-1.25)  mg/dL


 


Est GFR (CKD-EPI)AfAm     (>60 ml/min/1.73 sqM)  


 


Est GFR (CKD-EPI)NonAf     (>60 ml/min/1.73 sqM)  


 


Glucose     (74-99)  mg/dL


 


Lactic Ac Sepsis Rflx     


 


Plasma Lactic Acid Jean-Claude  4.7 H*    (0.7-2.0)  mmol/L


 


Calcium     (8.4-10.2)  mg/dL


 


Total Bilirubin     (0.2-1.3)  mg/dL


 


AST     (17-59)  U/L


 


ALT     (21-72)  U/L


 


Alkaline Phosphatase     ()  U/L


 


Total Creatine Kinase     ()  U/L


 


CK-MB (CK-2)     (0.0-2.4)  ng/mL


 


CK-MB (CK-2) Rel Index     


 


Troponin I     (0.000-0.034)  ng/mL


 


Total Protein     (6.3-8.2)  g/dL


 


Albumin     (3.5-5.0)  g/dL


 


Amylase     ()  U/L


 


Lipase     ()  U/L


 


Urine Color     


 


Urine Appearance     (Clear)  


 


Urine pH     (5.0-8.0)  


 


Ur Specific Gravity     (1.001-1.035)  


 


Urine Protein     (Negative)  


 


Urine Glucose (UA)     (Negative)  


 


Urine Ketones     (Negative)  


 


Urine Blood     (Negative)  


 


Urine Nitrite     (Negative)  


 


Urine Bilirubin     (Negative)  


 


Urine Urobilinogen     (<2.0)  mg/dL


 


Ur Leukocyte Esterase     (Negative)  


 


Stool Occult Blood     (Negative)  


 


Blood Type    O Negative  


 


Blood Type Confirm     


 


Blood Type Recheck    CABO Indicated  


 


Antibody Screen    NEGATIVE  


 


Spec Expiration Date    01/18/2019 - 2326  














  01/15/19 01/15/19 01/15/19 Range/Units





  00:26 01:14 01:32 


 


WBC     (3.8-10.6)  k/uL


 


RBC     (4.30-5.90)  m/uL


 


Hgb     (13.0-17.5)  gm/dL


 


Hct     (39.0-53.0)  %


 


MCV     (80.0-100.0)  fL


 


MCH     (25.0-35.0)  pg


 


MCHC     (31.0-37.0)  g/dL


 


RDW     (11.5-15.5)  %


 


Plt Count     (150-450)  k/uL


 


Neutrophils %     %


 


Lymphocytes %     %


 


Monocytes %     %


 


Eosinophils %     %


 


Basophils %     %


 


Neutrophils #     (1.3-7.7)  k/uL


 


Lymphocytes #     (1.0-4.8)  k/uL


 


Monocytes #     (0-1.0)  k/uL


 


Eosinophils #     (0-0.7)  k/uL


 


Basophils #     (0-0.2)  k/uL


 


PT     (9.0-12.0)  sec


 


INR     (<1.2)  


 


APTT     (22.0-30.0)  sec


 


Sodium     (137-145)  mmol/L


 


Potassium     (3.5-5.1)  mmol/L


 


Chloride     ()  mmol/L


 


Carbon Dioxide     (22-30)  mmol/L


 


Anion Gap     mmol/L


 


BUN     (9-20)  mg/dL


 


Creatinine     (0.66-1.25)  mg/dL


 


Est GFR (CKD-EPI)AfAm     (>60 ml/min/1.73 sqM)  


 


Est GFR (CKD-EPI)NonAf     (>60 ml/min/1.73 sqM)  


 


Glucose     (74-99)  mg/dL


 


Lactic Ac Sepsis Rflx   Y   


 


Plasma Lactic Acid Jean-Claude     (0.7-2.0)  mmol/L


 


Calcium     (8.4-10.2)  mg/dL


 


Total Bilirubin     (0.2-1.3)  mg/dL


 


AST     (17-59)  U/L


 


ALT     (21-72)  U/L


 


Alkaline Phosphatase     ()  U/L


 


Total Creatine Kinase     ()  U/L


 


CK-MB (CK-2)     (0.0-2.4)  ng/mL


 


CK-MB (CK-2) Rel Index     


 


Troponin I     (0.000-0.034)  ng/mL


 


Total Protein     (6.3-8.2)  g/dL


 


Albumin     (3.5-5.0)  g/dL


 


Amylase     ()  U/L


 


Lipase     ()  U/L


 


Urine Color    Yellow  


 


Urine Appearance    Clear  (Clear)  


 


Urine pH    5.0  (5.0-8.0)  


 


Ur Specific Gravity    1.011  (1.001-1.035)  


 


Urine Protein    Negative  (Negative)  


 


Urine Glucose (UA)    Negative  (Negative)  


 


Urine Ketones    Negative  (Negative)  


 


Urine Blood    Negative  (Negative)  


 


Urine Nitrite    Negative  (Negative)  


 


Urine Bilirubin    Negative  (Negative)  


 


Urine Urobilinogen    <2.0  (<2.0)  mg/dL


 


Ur Leukocyte Esterase    Negative  (Negative)  


 


Stool Occult Blood  Negative    (Negative)  


 


Blood Type     


 


Blood Type Confirm     


 


Blood Type Recheck     


 


Antibody Screen     


 


Spec Expiration Date     














  01/15/19 Range/Units





  01:42 


 


WBC   (3.8-10.6)  k/uL


 


RBC   (4.30-5.90)  m/uL


 


Hgb   (13.0-17.5)  gm/dL


 


Hct   (39.0-53.0)  %


 


MCV   (80.0-100.0)  fL


 


MCH   (25.0-35.0)  pg


 


MCHC   (31.0-37.0)  g/dL


 


RDW   (11.5-15.5)  %


 


Plt Count   (150-450)  k/uL


 


Neutrophils %   %


 


Lymphocytes %   %


 


Monocytes %   %


 


Eosinophils %   %


 


Basophils %   %


 


Neutrophils #   (1.3-7.7)  k/uL


 


Lymphocytes #   (1.0-4.8)  k/uL


 


Monocytes #   (0-1.0)  k/uL


 


Eosinophils #   (0-0.7)  k/uL


 


Basophils #   (0-0.2)  k/uL


 


PT   (9.0-12.0)  sec


 


INR   (<1.2)  


 


APTT   (22.0-30.0)  sec


 


Sodium   (137-145)  mmol/L


 


Potassium   (3.5-5.1)  mmol/L


 


Chloride   ()  mmol/L


 


Carbon Dioxide   (22-30)  mmol/L


 


Anion Gap   mmol/L


 


BUN   (9-20)  mg/dL


 


Creatinine   (0.66-1.25)  mg/dL


 


Est GFR (CKD-EPI)AfAm   (>60 ml/min/1.73 sqM)  


 


Est GFR (CKD-EPI)NonAf   (>60 ml/min/1.73 sqM)  


 


Glucose   (74-99)  mg/dL


 


Lactic Ac Sepsis Rflx   


 


Plasma Lactic Acid Jean-Claude   (0.7-2.0)  mmol/L


 


Calcium   (8.4-10.2)  mg/dL


 


Total Bilirubin   (0.2-1.3)  mg/dL


 


AST   (17-59)  U/L


 


ALT   (21-72)  U/L


 


Alkaline Phosphatase   ()  U/L


 


Total Creatine Kinase   ()  U/L


 


CK-MB (CK-2)   (0.0-2.4)  ng/mL


 


CK-MB (CK-2) Rel Index   


 


Troponin I   (0.000-0.034)  ng/mL


 


Total Protein   (6.3-8.2)  g/dL


 


Albumin   (3.5-5.0)  g/dL


 


Amylase   ()  U/L


 


Lipase   ()  U/L


 


Urine Color   


 


Urine Appearance   (Clear)  


 


Urine pH   (5.0-8.0)  


 


Ur Specific Gravity   (1.001-1.035)  


 


Urine Protein   (Negative)  


 


Urine Glucose (UA)   (Negative)  


 


Urine Ketones   (Negative)  


 


Urine Blood   (Negative)  


 


Urine Nitrite   (Negative)  


 


Urine Bilirubin   (Negative)  


 


Urine Urobilinogen   (<2.0)  mg/dL


 


Ur Leukocyte Esterase   (Negative)  


 


Stool Occult Blood   (Negative)  


 


Blood Type   


 


Blood Type Confirm  O Negative  


 


Blood Type Recheck   


 


Antibody Screen   


 


Spec Expiration Date   














Disposition





<Daksha Small - Last Filed: 01/15/19 02:32>





<Alex Dumas - Last Filed: 01/16/19 17:25>


Clinical Impression: 


 Acute abdomen, Ileus, Dehydration, Lactic acidosis





Disposition: ADMITTED AS IP TO THIS HOSP


Condition: Stable

## 2019-01-15 NOTE — P.HPIM
History of Present Illness





this is a pleasant 43 [facility] American[facility] with past medical history 

of diabetes mellitus, hypertension, hyperlipidemia, GERD, seizure disorder, 

diabetic neuropathy, fatty liver, occasional low back pain.he presents because 

of abdominal pain.  Patient states that his pain was in the epigastrium, severe 

10/10 in severity on admission when he started 1-2 days ago coming down to 1/10 

in severity currently.  Lack some sick wheeze, nonradiating.  Associated with 

black stool and blood in his vomiting as he has some nausea and vomiting as 

well.


on admission Vitas looks stable.  He has mild leukocytosis at 11.4.  BMP was 

unremarkable except for mild hyponatremia.  High lactic acid of 4.7 came back 

to normal 0.9.CT of the abdomen with contrast showing diffuse small bowel loops 

distended with fluid in the left upper quadrant consistent with ileus, with 

extensive pancreatic calcification consistent with chronic pancreatitis, and 

change





Review of Systems





CONSTITUTIONAL: No fever, no malaise, no fatigue. 


HEENT: No recent visual problems or hearing problems. Denied any sore throat. 


CARDIOVASCULAR: No  orthopnea, PND, no palpitations, no syncope. 


PULMONARY: No shortness of breath, no cough, no hemoptysis. 


GASTROINTESTINAL: No diarrhea, no nausea, no vomiting, no abdominal pain. 

Normoactive bowel sounds. 


NEUROLOGICAL: No headaches, no weakness, no numbness. 


HEMATOLOGICAL: Denies any bleeding or petechiae. 


GENITOURINARY: Denies any burning micturition, frequency, or urgency. 


MUSCULOSKELETAL/RHEUMATOLOGICAL: Denies any joint pain, swelling, or any muscle 

pain. 


ENDOCRINE: Denies any polyuria or polydipsia.











Past Medical History


Past Medical History: Diabetes Mellitus, GERD/Reflux, Hyperlipidemia, 

Hypertension, Liver Disease, Seizure Disorder


Additional Past Medical History / Comment(s): IDDM type I with insulin pump, DKA

, neuropathy bilateral feet, metabolic encephalitis post seizure, seizures-last 

one 2016, fatty liver, occasional low back pain, gastritis, hiatal hernina, 

abdominal pain/vomiting and diarrhea at times, gout bilateral feet.


History of Any Multi-Drug Resistant Organisms: None Reported


Past Surgical History: Back Surgery


Additional Past Surgical History / Comment(s): EGDs/colonoscopies, low back 

spinal fusion, L toes with screws/bunionectomy.


Past Anesthesia/Blood Transfusion Reactions: Motion Sickness


Smoking Status: Current every day smoker





- Past Family History


  ** Father


Family Medical History: Unable to Obtain


Additional Family Medical History / Comment(s): Father was murdered in his 30's





  ** Mother


Family Medical History: No Reported History, Hypertension


Additional Family Medical History / Comment(s): Mother is 64 yrs old.  Mother 

is 64 yrs old





  ** Brother(s)


Family Medical History: Hypertension





  ** Sister(s)


Family Medical History: Hypertension





Medications and Allergies


 Home Medications











 Medication  Instructions  Recorded  Confirmed  Type


 


Folic Acid 1 mg PO DAILY 07/16/14 01/15/19 History


 


Lisinopril [Zestril] 10 mg PO DAILY 07/16/14 01/15/19 History


 


Thiamine [Vitamin B-1] 100 mg PO DAILY 07/16/14 01/15/19 History


 


Omeprazole [PriLOSEC] 20 mg PO [id number]-[id number] 12/10/15 01/15/19 History


 


Hydrocodone/Acetaminophen [Norco 1 tab PO BID PRN 06/29/16 01/15/19 History





7.5-325]    


 


Lacosamide [Vimpat] 100 mg PO BID 06/29/16 01/15/19 History


 


Potassium Chloride ER [K-Dur 10] 10 meq PO DAILY 06/29/16 01/15/19 History


 


levETIRAcetam [Keppra] 500 mg PO BID 08/22/16 01/15/19 History


 


Magnesium Oxide [Mag-Ox] 400 mg PO BID 04/03/18 01/15/19 History


 


PARoxetine HCL [Paxil] 30 mg PO DAILY 04/03/18 01/15/19 History


 


Atorvastatin [Lipitor] 10 mg PO DAILY 01/15/19 01/15/19 History


 


INSULIN LISPRO (For Pump) [humaLOG 0.01 units SQ-PUMP CONTINUOUS 01/15/19 01/15/

19 History





(For Pump)]    


 


Lipase/Protease/Amylase [Creon Dr 24,000 units PO TID 01/15/19 01/15/19 History





12,000 Units Capsule]    


 


QUEtiapine [SEROquel] 100 mg PO HS 01/15/19 01/15/19 History


 


Vitamin D2  20,000 Units 20,000 units PO MO 01/15/19 01/15/19 History











 Allergies











Allergy/AdvReac Type Severity Reaction Status Date / Time


 


No Known Allergies Allergy   Verified 01/15/19 06:44














Physical Exam


Vitals: 


 Vital Signs











  Temp Pulse Resp BP Pulse Ox


 


 01/15/19 06:36   78  16  100/71  100


 


 01/15/19 02:29   72  16  106/66  100


 


 01/14/19 23:29  97.7 F  104 H  18  102/68  99








 Intake and Output











 01/14/19 01/15/19 01/15/19





 22:59 06:59 14:59


 


Other:   


 


  Weight  53.524 kg 














GENERAL: The patient is alert and oriented x3, not in any acute distress. Well 

developed, well nourished. 


HEENT: Pupils are round and equally reacting to light. EOMI. No scleral 

icterus. No conjunctival pallor. Normocephalic, atraumatic. No pharyngeal 

erythema. No thyromegaly. 


CARDIOVASCULAR: S1 and S2 present. No murmurs, rubs, or gallops. 


PULMONARY: Chest is clear to auscultation, no wheezing or crackles. 


-ABDOMEN: Soft, epigastric tenderness with no rebound tenderness or guarding, 

nondistended, normoactive bowel sounds. No palpable organomegaly. 


MUSCULOSKELETAL: No joint swelling or deformity. 


EXTREMITIES: No cyanosis, clubbing, or pedal edema. 


NEUROLOGICAL: Gross neurological examination did not reveal any focal deficits. 


SKIN: No rashes.











Results


CBC & Chem 7: 


 01/15/19 00:26





 01/15/19 00:26


Labs: 


 Abnormal Lab Results - Last 24 Hours (Table)











  01/15/19 01/15/19 01/15/19 Range/Units





  00:26 00:26 00:26 


 


WBC   11.4 H   (3.8-10.6)  k/uL


 


RBC   4.24 L   (4.30-5.90)  m/uL


 


Hgb   12.6 L   (13.0-17.5)  gm/dL


 


Hct   38.4 L   (39.0-53.0)  %


 


Sodium  135 L    (137-145)  mmol/L


 


Chloride  92 L    ()  mmol/L


 


BUN  22 H    (9-20)  mg/dL


 


Glucose  215 H    (74-99)  mg/dL


 


Plasma Lactic Acid Jean-Claude    4.7 H*  (0.7-2.0)  mmol/L


 


Calcium  11.0 H    (8.4-10.2)  mg/dL


 


AST  105 H    (17-59)  U/L


 


ALT  74 H    (21-72)  U/L


 


Total Protein  9.1 H    (6.3-8.2)  g/dL


 


Albumin  5.3 H    (3.5-5.0)  g/dL


 


Lipase  10 L    ()  U/L














Thrombosis Risk Factor Assmnt





- Choose All That Apply


Any of the Below Risk Factors Present?: Yes


Each Factor Represents 1 point: Age 41-60 years


Other Risk Factors: No


Other congenital or acquired thrombophilia - If yes, enter type in comment: No


Thrombosis Risk Factor Assessment Total Risk Factor Score: 1


Thrombosis Risk Factor Assessment Level: Low Risk





Assessment and Plan


Assessment: 





ileus versus early small bowel obstruction


black stool and blood in vomiting


History of insulin-dependent diabetes mellitus


Hypertension, essential


Hyperlipidemia


GERD


Fatty liver


history of Seizure disorder


diabetic neuropathy





Plan: 





this is a pleasant 43 years old male who presents because of abdominal pain.  

Keep the patient nothing by mouth, call surgical S consult.  Pain management 

and IV fluids.  Consult GI too. he was on ibuprofen which was stopped.Labs and 

medication were reviewed..pain management.  Continue same treatment.  Continue 

with symptomatic treatment.  Resume home medication.  Monitor lytes and vitals.

  DVT and GI prophylaxis.  Further recommendations of the clinical course of 

the patient


DVT prophylaxis: no heparin review of possible GI bleed.  Mechanical


GI Prophylaxis: Pepcid





Prognosis is guarded

## 2019-01-15 NOTE — XR
EXAMINATION TYPE: XR KUB

 

DATE OF EXAM: 1/15/2019

 

COMPARISON: NONE

 

HISTORY: Abdominal pain

 

TECHNIQUE: 2 views upright

 

FINDINGS: There is no sign of intestinal obstruction or pneumoperitoneum. Fecal pattern is normal. Jenn
ng bases are clear. There are no pathologic calcifications over the kidneys.

 

IMPRESSION: Nonacute abdomen.

## 2019-01-16 VITALS
RESPIRATION RATE: 16 BRPM | SYSTOLIC BLOOD PRESSURE: 149 MMHG | HEART RATE: 71 BPM | DIASTOLIC BLOOD PRESSURE: 89 MMHG | TEMPERATURE: 98.5 F

## 2019-01-16 LAB
ALBUMIN SERPL-MCNC: 4.1 G/DL (ref 3.5–5)
ALP SERPL-CCNC: 84 U/L (ref 38–126)
ALT SERPL-CCNC: 68 U/L (ref 21–72)
ANION GAP SERPL CALC-SCNC: 5 MMOL/L
AST SERPL-CCNC: 111 U/L (ref 17–59)
BASOPHILS # BLD AUTO: 0.1 K/UL (ref 0–0.2)
BASOPHILS NFR BLD AUTO: 1 %
BUN SERPL-SCNC: 10 MG/DL (ref 9–20)
CALCIUM SPEC-MCNC: 9.9 MG/DL (ref 8.4–10.2)
CHLORIDE SERPL-SCNC: 109 MMOL/L (ref 98–107)
CO2 SERPL-SCNC: 29 MMOL/L (ref 22–30)
EOSINOPHIL # BLD AUTO: 0.6 K/UL (ref 0–0.7)
EOSINOPHIL NFR BLD AUTO: 9 %
ERYTHROCYTE [DISTWIDTH] IN BLOOD BY AUTOMATED COUNT: 4.02 M/UL (ref 4.3–5.9)
ERYTHROCYTE [DISTWIDTH] IN BLOOD: 12.7 % (ref 11.5–15.5)
GLUCOSE BLD-MCNC: 107 MG/DL (ref 75–99)
GLUCOSE BLD-MCNC: 88 MG/DL (ref 75–99)
GLUCOSE SERPL-MCNC: 95 MG/DL (ref 74–99)
HBA1C MFR BLD: 7.1 % (ref 4–6)
HCT VFR BLD AUTO: 37.5 % (ref 39–53)
HGB BLD-MCNC: 11.8 GM/DL (ref 13–17.5)
LYMPHOCYTES # SPEC AUTO: 2.4 K/UL (ref 1–4.8)
LYMPHOCYTES NFR SPEC AUTO: 37 %
MCH RBC QN AUTO: 29.4 PG (ref 25–35)
MCHC RBC AUTO-ENTMCNC: 31.5 G/DL (ref 31–37)
MCV RBC AUTO: 93.3 FL (ref 80–100)
MONOCYTES # BLD AUTO: 0.4 K/UL (ref 0–1)
MONOCYTES NFR BLD AUTO: 6 %
NEUTROPHILS # BLD AUTO: 2.9 K/UL (ref 1.3–7.7)
NEUTROPHILS NFR BLD AUTO: 44 %
PLATELET # BLD AUTO: 167 K/UL (ref 150–450)
POTASSIUM SERPL-SCNC: 3.8 MMOL/L (ref 3.5–5.1)
PROT SERPL-MCNC: 7.1 G/DL (ref 6.3–8.2)
SODIUM SERPL-SCNC: 143 MMOL/L (ref 137–145)
WBC # BLD AUTO: 6.6 K/UL (ref 3.8–10.6)

## 2019-01-16 RX ADMIN — LISINOPRIL SCH MG: 10 TABLET ORAL at 07:45

## 2019-01-16 RX ADMIN — INSULIN LISPRO SCH: 100 INJECTION, SOLUTION INTRAVENOUS; SUBCUTANEOUS at 12:23

## 2019-01-16 RX ADMIN — INSULIN LISPRO SCH: 100 INJECTION, SOLUTION INTRAVENOUS; SUBCUTANEOUS at 03:59

## 2019-01-16 RX ADMIN — INSULIN LISPRO SCH: 100 INJECTION, SOLUTION INTRAVENOUS; SUBCUTANEOUS at 07:45

## 2019-01-16 RX ADMIN — FAMOTIDINE SCH MG: 20 TABLET, FILM COATED ORAL at 07:45

## 2019-01-16 RX ADMIN — CEFAZOLIN SCH MLS/HR: 330 INJECTION, POWDER, FOR SOLUTION INTRAMUSCULAR; INTRAVENOUS at 06:47

## 2019-01-16 NOTE — P.PN
Subjective





this is a pleasant 43 [facility] old [facility] Americanmale with past medical history 

of diabetes mellitus, hypertension, hyperlipidemia, GERD, seizure disorder, 

diabetic neuropathy, fatty liver, occasional low back pain.he presents because 

of abdominal pain.  Patient states that his pain was in the epigastrium, severe 

10/10 in severity on admission when he started 1-2 days ago coming down to 1/10 

in severity currently.  Lack some sick wheeze, nonradiating.  Associated with 

black stool and blood in his vomiting as he has some nausea and vomiting as 

well.


on admission Vitas looks stable.  He has mild leukocytosis at 11.4.  BMP was 

unremarkable except for mild hyponatremia.  High lactic acid of 4.7 came back 

to normal 0.9.CT of the abdomen with contrast showing diffuse small bowel loops 

distended with fluid in the left upper quadrant consistent with ileus, with 

extensive pancreatic calcification consistent with chronic pancreatitis, and 

change





01/16/2019


Patient feels better today and he is telling me abdominal pain is 0/10 in 

severity.  No nausea vomiting.  He is tolerating his liquid diet well and he 

was sent to be advanced to regular diet.  He is passing gases spots no bowel 

movement..  Vitals stable with no fever.  WBC within normal limits and his 

hemoglobin is 11.8.  BMP and liver enzymes were unremarkable except for mildly 

elevated AST.  Surgical follow-up is appreciated.





Objective





- Vital Signs


Vital signs: 


 Vital Signs











Temp  96.8 F L  01/16/19 06:11


 


Pulse  81   01/16/19 06:11


 


Resp  17   01/16/19 07:47


 


BP  126/75   01/16/19 06:11


 


Pulse Ox  99   01/16/19 06:11








 Intake & Output











 01/15/19 01/16/19 01/16/19





 18:59 06:59 18:59


 


Weight 52 kg  


 


Other:   


 


  Voiding Method Toilet  Toilet


 


  # Voids  1 














- Exam





GENERAL: The patient is alert and oriented x3, not in any acute distress. Well 

developed, well nourished. 


HEENT: Pupils are round and equally reacting to light. EOMI. No scleral 

icterus. No conjunctival pallor. Normocephalic, atraumatic. No pharyngeal 

erythema. No thyromegaly. 


CARDIOVASCULAR: S1 and S2 present. No murmurs, rubs, or gallops. 


PULMONARY: Chest is clear to auscultation, no wheezing or crackles. 


ABDOMEN: Soft, nontender, nondistended, normoactive bowel sounds. No palpable 

organomegaly. 


MUSCULOSKELETAL: No joint swelling or deformity. 


EXTREMITIES: No cyanosis, clubbing, or pedal edema. 


NEUROLOGICAL: Gross neurological examination did not reveal any focal deficits. 


SKIN: No rashes.





- Labs


CBC & Chem 7: 


 01/16/19 09:35





 01/16/19 09:35


Labs: 


 Abnormal Lab Results - Last 24 Hours (Table)











  01/15/19 01/15/19 01/15/19 Range/Units





  12:29 17:04 20:31 


 


RBC     (4.30-5.90)  m/uL


 


Hgb     (13.0-17.5)  gm/dL


 


Hct     (39.0-53.0)  %


 


Chloride     ()  mmol/L


 


POC Glucose (mg/dL)  156 H  172 H  171 H  (75-99)  mg/dL


 


AST     (17-59)  U/L














  01/16/19 01/16/19 01/16/19 Range/Units





  07:30 09:35 09:35 


 


RBC   4.02 L   (4.30-5.90)  m/uL


 


Hgb   11.8 L   (13.0-17.5)  gm/dL


 


Hct   37.5 L   (39.0-53.0)  %


 


Chloride    109 H  ()  mmol/L


 


POC Glucose (mg/dL)  107 H    (75-99)  mg/dL


 


AST    111 H  (17-59)  U/L














Assessment and Plan


Assessment: 





ileus versus early small bowel obstruction


black stool and blood in vomiting


History of insulin-dependent diabetes mellitus


Hypertension, essential


Hyperlipidemia


GERD


Fatty liver


history of Seizure disorder


diabetic neuropathy





Plan: 





this is a pleasant 43 years old male who presents because of abdominal pain.  

Keep the patient nothing by mouth, call surgical S consult.  Pain management 

and IV fluids.  Consult GI too. he was on ibuprofen which was stopped.Labs and 

medication were reviewed..pain management.  Continue same treatment.  Continue 

with symptomatic treatment.  Resume home medication.  Monitor lytes and vitals.

  DVT and GI prophylaxis.  Further recommendations of the clinical course of 

the patient


DVT prophylaxis: no heparin review of possible GI bleed.  Mechanical


GI Prophylaxis: Pepcid





Prognosis is guarded

## 2019-01-16 NOTE — P.PN
Subjective


Progress Note Date: 01/16/19





CHIEF COMPLAINT: Abdominal pain.  Ileus





HISTORY OF PRESENT ILLNESS: 43-year-old -American male who presented to 

the emergency room due to abdominal pain.  Patient currently denies abdominal 

pain. Denies nausea or vomiting. Reports passing flatus. Denies BM. Tolerating 

clear liquid diet. Hemoglobin 11.8. WBC 6.6





PHYSICAL EXAM: 


VITAL SIGNS: Currently stable.


GENERAL: Well-developed in no acute distress. 


HEENT:  No sclera icterus. Extraocular movements grossly intact.  Moist buccal 

mucosa. 


Head is atraumatic, normocephalic. Hears conversational speech. No nasal 

drainage.


NECK:  Supple without lymphadenopathy.


CHEST:  Non-labored respirations and equal bilateral excursions. 


CARDIOVASCULAR:  Regular rate with regular rhythm.  Palpable 2+ radial pulses.


ABDOMEN:  Soft.  Nondistended.  No peritonitis. 


MUSCULOSKELETAL:  No clubbing, cyanosis or edema.


NEUROLOGIC:  No focal or lateralizing signs.  Cranial nerves II through XII 

grossly intact.


PSYCH: Alert and oriented x  3


SKIN: Well perfused.  Good skin turgor. 





ASSESSMENT: 


1. Abdominal pain, CT reveals a few small bowel loops distended with fluid in 

the left upper quadrant consistent with ileus


2. Ileus


3. Dark stools, patient reports taking pepto bismol at home, stool for occult 

blood negative, r/o GI bleeding


4. Chronic pancreatitis


5. Daily alcohol use


6. Leukocytosis


7. Transaminitis





PLAN: 


Advance diet to full liquid for lunch. May advance as tolerated. Await BM. No 

surgical intervention at this time. Await further recommendations from GI.





Nurse practitioner note has been reviewed by physician. Signing provider agrees 

with the documented findings, assessment, and plan of care. 








Objective





- Vital Signs


Vital signs: 


 Vital Signs











Temp  96.8 F L  01/16/19 06:11


 


Pulse  81   01/16/19 06:11


 


Resp  17   01/16/19 07:47


 


BP  126/75   01/16/19 06:11


 


Pulse Ox  99   01/16/19 06:11








 Intake & Output











 01/15/19 01/16/19 01/16/19





 18:59 06:59 18:59


 


Weight 52 kg  


 


Other:   


 


  Voiding Method Toilet  Toilet


 


  # Voids  1 














- Labs


CBC & Chem 7: 


 01/16/19 09:35





 01/16/19 09:35


Labs: 


 Abnormal Lab Results - Last 24 Hours (Table)











  01/15/19 01/15/19 01/15/19 Range/Units





  12:29 17:04 20:31 


 


RBC     (4.30-5.90)  m/uL


 


Hgb     (13.0-17.5)  gm/dL


 


Hct     (39.0-53.0)  %


 


Chloride     ()  mmol/L


 


POC Glucose (mg/dL)  156 H  172 H  171 H  (75-99)  mg/dL


 


AST     (17-59)  U/L














  01/16/19 01/16/19 01/16/19 Range/Units





  07:30 09:35 09:35 


 


RBC   4.02 L   (4.30-5.90)  m/uL


 


Hgb   11.8 L   (13.0-17.5)  gm/dL


 


Hct   37.5 L   (39.0-53.0)  %


 


Chloride    109 H  ()  mmol/L


 


POC Glucose (mg/dL)  107 H    (75-99)  mg/dL


 


AST    111 H  (17-59)  U/L

## 2019-01-16 NOTE — P.CONS
History of Present Illness





- Reason for Consult


Consult date: 01/16/19


Coffee-ground emesis possible melena


Requesting physician: Raciel E Sheet





- Chief Complaint


Coffee-ground emesis abdominal pain





- History of Present Illness


43-year-old male passed a history of pancreatitis, chronic daily alcohol 

consumption, NSAID usage for arthritic pain admitted with epigastric pain coffee

-ground emesis and black colored stool.  Patient has been taking Pepto-Bismol 

also is not sure if the black stools related to Pepto-Bismol or bleeding.  

Denies gross hematochezia.  No history of peptic ulcer disease or GI bleed.  CT 

abdomen diffuse small bowel loops distended with fluid and left upper quadrant 

consistent with ileus.  General surgery following no surgical plans at this 

time.





No recent EGD.  Feels well today.  Denies abdominal pain.  No further emesis.  

No passage of black or red stool.  Tolerating regular diet.  Admission 

hemoglobin


12.6 presently 11.8.  Platelet 167.  FOBT negative.  BUN 22.  Creatinine 1.1.





Review of Systems


Constitutional: Denies fever, chills, sweats, weight gain, or loss.


HEENT: Negative for migraines, blurred vision or loss, earaches, drainage, 

tinnitus, oral mucosal lesions, dysphagia, or odynophagia.


Cardiac: Negative for chest pain, arrhythmias, or palpitation.


Respiratory: Negative for shortness of breath, hemoptysis, cough, or sputum 

production.


Gastrointestinal: See HPI for pertinent findings.


Genitourinary: Negative for hematuria, urgency, frequency, polyuria, dysuria, 

or penile discharge.


Musculoskeletal: Negative for muscle aches, swelling, arthritis, and 

arthralgias.  


Neurologic: Negative for stroke or TIA.


Endocrine: Negative for thyroid problems.


Skin: Negative for rash or itching.


Psychiatric: Negative history for depression and anxiety








Past Medical History


Past Medical History: Diabetes Mellitus, GERD/Reflux, Hyperlipidemia, 

Hypertension, Liver Disease, Seizure Disorder


Additional Past Medical History / Comment(s): IDDM type I with insulin pump, DKA

, neuropathy bilateral feet, metabolic encephalitis post seizure, seizures-last 

one 2016, fatty liver, occasional low back pain, gastritis, hiatal hernina, 

abdominal pain/vomiting and diarrhea at times, gout bilateral feet.


History of Any Multi-Drug Resistant Organisms: None Reported


Past Surgical History: Back Surgery


Additional Past Surgical History / Comment(s): EGDs/colonoscopies, low back 

spinal fusion, L toes with screws/bunionectomy.


Past Anesthesia/Blood Transfusion Reactions: Motion Sickness


Smoking Status: Current every day smoker





- Past Family History


  ** Father


Family Medical History: Unable to Obtain


Additional Family Medical History / Comment(s): Father was murdered in his 30's





  ** Mother


Family Medical History: No Reported History, Hypertension


Additional Family Medical History / Comment(s): Mother is 64 yrs old.  Mother 

is 64 yrs old





  ** Brother(s)


Family Medical History: Hypertension





  ** Sister(s)


Family Medical History: Hypertension





Medications and Allergies


 Home Medications











 Medication  Instructions  Recorded  Confirmed  Type


 


Folic Acid 1 mg PO DAILY 07/16/14 01/15/19 History


 


Lisinopril [Zestril] 10 mg PO DAILY 07/16/14 01/15/19 History


 


Thiamine [Vitamin B-1] 100 mg PO DAILY 07/16/14 01/15/19 History


 


Omeprazole [PriLOSEC] 20 mg PO AC-BRKFST 12/10/15 01/15/19 History


 


Hydrocodone/Acetaminophen [Norco 1 tab PO BID PRN 06/29/16 01/15/19 History





7.5-325]    


 


Lacosamide [Vimpat] 100 mg PO BID 06/29/16 01/15/19 History


 


Potassium Chloride ER [K-Dur 10] 10 meq PO DAILY 06/29/16 01/15/19 History


 


levETIRAcetam [Keppra] 500 mg PO BID 08/22/16 01/15/19 History


 


Magnesium Oxide [Mag-Ox] 400 mg PO BID 04/03/18 01/15/19 History


 


PARoxetine HCL [Paxil] 30 mg PO DAILY 04/03/18 01/15/19 History


 


Atorvastatin [Lipitor] 10 mg PO DAILY 01/15/19 01/15/19 History


 


INSULIN LISPRO (For Pump) [humaLOG 0.01 units SQ-PUMP CONTINUOUS 01/15/19 01/15/

19 History





(For Pump)]    


 


Lipase/Protease/Amylase [Creon Dr 24,000 units PO TID 01/15/19 01/15/19 History





12,000 Units Capsule]    


 


QUEtiapine [SEROquel] 100 mg PO HS 01/15/19 01/15/19 History


 


Vitamin D2  20,000 Units 20,000 units PO MO 01/15/19 01/15/19 History











 Allergies











Allergy/AdvReac Type Severity Reaction Status Date / Time


 


No Known Allergies Allergy   Verified 01/15/19 06:44














Physical Exam


Vitals: 


 Vital Signs











  Temp Pulse Resp BP Pulse Ox


 


 01/16/19 07:47    17  


 


 01/16/19 06:11  96.8 F L  81  17  126/75  99


 


 01/15/19 23:00  98.7 F  65  17  143/71  100


 


 01/15/19 15:57    16  


 


 01/15/19 14:58  99.5 F  69  16  119/68  99








 Intake and Output











 01/15/19 01/16/19 01/16/19





 22:59 06:59 14:59


 


Other:   


 


  Voiding Method Toilet  Toilet


 


  # Voids 2 1 


 


  Weight 52 kg  











General appearance: The patient is alert, oriented, in no acute distress.


HET: Head is normocephalic and atraumatic.  Pupils are equal and reactive.  

Oropharynx is clear without lesions.


Neck: Supple without lymphadenopathy.  Trachea midline.


Heart: S1 S2.  Regular rate and rhythm.


Lungs: No crackles or wheezes are heard.


Abdomen: Soft, nontender, nondistended with  bowel sounds.  No peritoneal 

signs.  No palpable organomegaly or masses.


Extremities: Normal skin color and turgor.  No cyanosis, rash, ulceration, 

clubbing, or edema.  Radial and pedal pulses are 2/4 bilaterally.


Neurological: No focal deficits.  Strength and sensation are grossly intact.








Results


CBC & Chem 7: 


 01/16/19 09:35





 01/16/19 09:35


Labs: 


 Abnormal Lab Results - Last 24 Hours (Table)











  01/15/19 01/15/19 01/16/19 Range/Units





  17:04 20:31 07:30 


 


RBC     (4.30-5.90)  m/uL


 


Hgb     (13.0-17.5)  gm/dL


 


Hct     (39.0-53.0)  %


 


Chloride     ()  mmol/L


 


POC Glucose (mg/dL)  172 H  171 H  107 H  (75-99)  mg/dL


 


AST     (17-59)  U/L














  01/16/19 01/16/19 Range/Units





  09:35 09:35 


 


RBC  4.02 L   (4.30-5.90)  m/uL


 


Hgb  11.8 L   (13.0-17.5)  gm/dL


 


Hct  37.5 L   (39.0-53.0)  %


 


Chloride   109 H  ()  mmol/L


 


POC Glucose (mg/dL)    (75-99)  mg/dL


 


AST   111 H  (17-59)  U/L











CT scan - abdomen: report reviewed (Dr. Acevedo)





Assessment and Plan


(1) Coffee ground emesis


Narrative/Plan: 


43-year-old male admitted with acute abdominal pain possible ileus with coffee-

ground emesis possible [place] tear possible alcohol-induced gastritis 

esophagitis duodenitis.  Passage of black-colored stool most likely secondary 

to Pepto-Bismol with negative FOBT.  No further episodes of bleeding abdominal 

pain is resolved.


Current Visit: Yes   Status: Acute   Code(s): K92.0 - HEMATEMESIS   SNOMED Code(

s): 58494748


   





(2) Abdominal pain


Current Visit: Yes   Status: Acute   Code(s): R10.9 - UNSPECIFIED ABDOMINAL 

PAIN   SNOMED Code(s): 07802013


   


Plan: 


1.  From a GI standpoint inpatient endoscopic exams not planned this patient 

abdominal pain nausea vomiting resolved, FOBT negative.  Patient was advised to 

abstain from alcohol.  Recommend Protonix 40 mg daily 2 weeks after discharge.

  If patient develops symptoms of hematemesis hematochezia melena and/or 

recurrence of abdominal pain is advised to return to the emergency room for 

reevaluation.








Thank you for this kind referral and the opportunity to participate in the care 

of your patient.  This consultation was discussed with Dr. Acevedo.  The 

impression and plan of care have been directed as dictated.

## 2019-01-20 NOTE — P.DS
Providers


Date of admission: 


01/15/19 02:33





Attending physician: 


Diana Soriano





Consults: 





 





01/15/19 11:58


Consult Physician Urgent 


   Consulting Provider: Gary Stanton


   Consult Reason/Comments: ileus


   Do you want consulting provider notified?: Yes





01/15/19 14:01


Consult Physician Urgent 


   Consulting Provider: Roldan Acevedo


   Consult Reason/Comments: hematamesis and black stool


   Do you want consulting provider notified?: Yes











Primary care physician: 


[facility] Course: 


Date of service: 01/06/2018





this is a pleasant 43 years old An American male with past medical history 

of diabetes mellitus, hypertension, hyperlipidemia, GERD, seizure disorder, 

diabetic neuropathy, fatty liver, occasional low back pain.he presents because 

of abdominal pain.  Patient states that his pain was in the epigastrium, severe 

10/10 in severity on admission when he started 1-2 days ago coming down to 01/

10 in severity upon discharge.  Associated with black stool and blood in his 

vomiting as he has some nausea and vomiting as well. .CT of the abdomen with 

contrast showing diffuse small bowel loops distended with fluid in the left 

upper quadrant consistent with ileus, with extensive pancreatic calcification 

consistent with chronic pancreatitis. He has mild leukocytosis at 11.4. which 

came back to normal upon discharge. which was treated with iv fluid , pain 

management and holding his diet. surgical team were following the pt . pt 

showed interval improvements in his signs and symptoms and on the day of 

discharge his abd pain , n/v were resolved completely. he tolerated regular 

diet and had bowel movement. pt was cleared for discharge by surgery and GI 

teams . given protonix for 2 wks recommended by GI team . Pt was instructed 

about the problems and management plan and Pt verbalized understanding and 

acceptance 


Pt is found stable and can be discharged to the community but needs follow up 

as outpt. pt agreed with appointments and timing for PCP and surgery f/u 





Discharge exam


Gen.: Patient alert awake and oriented X 3, NOT IN DISTRESS


CVS: s1-s2, RRR, no murmur


CHEST:bilateral CTA, no wheezing or crepitation


Abdomen: Soft, no tenderness, no distention, positive bowel sounds


Extremities: No leg edema or induration





time spent : more than 35 min 


Patient Condition at Discharge: Stable





Plan - Discharge Summary


Discharge Rx Participant: Yes


New Discharge Prescriptions: 


New


   Mirtazapine [Remeron] 45 mg PO HS  tablet


   Pantoprazole Sodium [Protonix] 40 mg PO DAILY #15 tablet.dr





Continue


   Thiamine [Vitamin B-1] 100 mg PO DAILY


   Lisinopril [Zestril] 10 mg PO DAILY


   Folic Acid 1 mg PO DAILY


   Hydrocodone/Acetaminophen [Norco 7.5-325] 1 tab PO BID PRN


     PRN Reason: Pain


   Lacosamide [Vimpat] 100 mg PO BID


   Potassium Chloride ER [K-Dur 10] 10 meq PO DAILY


   levETIRAcetam [Keppra] 500 mg PO BID


   PARoxetine HCL [Paxil] 30 mg PO DAILY


   Magnesium Oxide [Mag-Ox] 400 mg PO BID


   QUEtiapine [SEROquel] 100 mg PO HS


   Lipase/Protease/Amylase [Creon Dr 12,000 Units Capsule] 24,000 units PO TID


   Atorvastatin [Lipitor] 10 mg PO DAILY


   Vitamin D2  20,000 Units 20,000 units PO MO


   INSULIN LISPRO (For Pump) [humaLOG (For Pump)] 0.01 units SQ-PUMP CONTINUOUS





Discontinued


   Omeprazole [PriLOSEC] 20 mg PO AC-BRKFST


Discharge Medication List





Folic Acid 1 mg PO DAILY 07/16/14 [History]


Lisinopril [Zestril] 10 mg PO DAILY 07/16/14 [History]


Thiamine [Vitamin B-1] 100 mg PO DAILY 07/16/14 [History]


Hydrocodone/Acetaminophen [Norco 7.5-325] 1 tab PO BID PRN 06/29/16 [History]


Lacosamide [Vimpat] 100 mg PO BID 06/29/16 [History]


Potassium Chloride ER [K-Dur 10] 10 meq PO DAILY 06/29/16 [History]


levETIRAcetam [Keppra] 500 mg PO BID 08/22/16 [History]


Magnesium Oxide [Mag-Ox] 400 mg PO BID 04/03/18 [History]


PARoxetine HCL [Paxil] 30 mg PO DAILY 04/03/18 [History]


Atorvastatin [Lipitor] 10 mg PO DAILY 01/15/19 [History]


INSULIN LISPRO (For Pump) [humaLOG (For Pump)] 0.01 units SQ-PUMP CONTINUOUS 01/

15/19 [History]


Lipase/Protease/Amylase [Creon Dr 12,000 Units Capsule] 24,000 units PO TID 01/

15/19 [History]


QUEtiapine [SEROquel] 100 mg PO HS 01/15/19 [History]


Vitamin D2  20,000 Units 20,000 units PO MO 01/15/19 [History]


Mirtazapine [Remeron] 45 mg PO HS  tablet 01/16/19 [Rx]


Pantoprazole Sodium [Protonix] 40 mg PO DAILY #15 tablet. 01/16/19 [Rx]








Follow up Appointment(s)/Referral(s): 


Nichole Hope MD [Primary Care Provider] - 01/21/19 10:15 am


Gary Stanton MD [STAFF PHYSICIAN] - 01/24/19 1:30 pm


Patient Instructions/Handouts:  Ileus (DC)


Activity/Diet/Wound Care/Special Instructions: 


Flu and pneumo vaccine given on discharge with VIS





Diabetic diet as tolerated.





Resume regular activities. 





No smoking or alcohol use.


Discharge Disposition: HOME SELF-CARE

## 2023-06-28 ENCOUNTER — HOSPITAL ENCOUNTER (OUTPATIENT)
Dept: HOSPITAL 47 - LABWHC1 | Age: 48
Discharge: HOME | End: 2023-06-28
Attending: INTERNAL MEDICINE
Payer: COMMERCIAL

## 2023-06-28 DIAGNOSIS — K86.89: ICD-10-CM

## 2023-06-28 DIAGNOSIS — E10.9: ICD-10-CM

## 2023-06-28 DIAGNOSIS — Z12.5: Primary | ICD-10-CM

## 2023-06-28 DIAGNOSIS — E78.5: ICD-10-CM

## 2023-06-28 PROCEDURE — 83690 ASSAY OF LIPASE: CPT

## 2023-06-28 PROCEDURE — 84153 ASSAY OF PSA TOTAL: CPT

## 2023-06-28 PROCEDURE — 36415 COLL VENOUS BLD VENIPUNCTURE: CPT

## 2023-06-28 PROCEDURE — 80061 LIPID PANEL: CPT

## 2023-06-28 PROCEDURE — 83036 HEMOGLOBIN GLYCOSYLATED A1C: CPT

## 2023-06-28 PROCEDURE — 84550 ASSAY OF BLOOD/URIC ACID: CPT

## 2023-06-28 PROCEDURE — 85025 COMPLETE CBC W/AUTO DIFF WBC: CPT

## 2023-06-28 PROCEDURE — 80053 COMPREHEN METABOLIC PANEL: CPT

## 2023-06-29 LAB
ALBUMIN SERPL-MCNC: 4.7 D/DL (ref 3.8–4.9)
ALBUMIN/GLOB SERPL: 1.68 RATIO (ref 1.6–3.17)
ALP SERPL-CCNC: 99 U/L (ref 41–126)
ALT SERPL-CCNC: 23 U/L (ref 10–49)
ANION GAP SERPL CALC-SCNC: 16.5 MMOL/L (ref 4–12)
AST SERPL-CCNC: 56 U/L (ref 14–35)
BASOPHILS # BLD AUTO: 0.05 X 10*3/UL (ref 0–0.1)
BASOPHILS NFR BLD AUTO: 0.7 %
BUN SERPL-SCNC: 17.4 MG/DL (ref 9–27)
BUN/CREAT SERPL: 17.4 RATIO (ref 12–20)
CALCIUM SPEC-MCNC: 9.7 MG/DL (ref 8.7–10.3)
CHLORIDE SERPL-SCNC: 102 MMOL/L (ref 96–109)
CHOLEST SERPL-MCNC: 124 MG/DL (ref 0–200)
CO2 SERPL-SCNC: 21.5 MMOL/L (ref 21.6–31.8)
EOSINOPHIL # BLD AUTO: 0.5 X 10*3/UL (ref 0.04–0.35)
EOSINOPHIL NFR BLD AUTO: 6.8 %
ERYTHROCYTE [DISTWIDTH] IN BLOOD BY AUTOMATED COUNT: 4.03 X 10*6/UL (ref 4.4–5.6)
ERYTHROCYTE [DISTWIDTH] IN BLOOD: 11.9 % (ref 11.5–14.5)
GLOBULIN SER CALC-MCNC: 2.8 D/DL (ref 1.6–3.3)
GLUCOSE SERPL-MCNC: 151 MG/DL (ref 70–110)
HCT VFR BLD AUTO: 35.7 % (ref 39.6–50)
HDLC SERPL-MCNC: 42.7 MG/DL (ref 40–60)
HGB BLD-MCNC: 11.8 D/DL (ref 12–15)
IMM GRANULOCYTES BLD QL AUTO: 0.1 %
LDLC SERPL CALC-MCNC: 30.9 MG/DL (ref 0–131)
LIPASE SERPL-CCNC: 6 U/L (ref 14–60)
LYMPHOCYTES # SPEC AUTO: 3.57 X 10*3/UL (ref 0.9–5)
LYMPHOCYTES NFR SPEC AUTO: 48.8 %
MCH RBC QN AUTO: 29.3 PG (ref 27–32)
MCHC RBC AUTO-ENTMCNC: 33.1 D/DL (ref 32–37)
MCV RBC AUTO: 88.6 FL (ref 80–97)
MONOCYTES # BLD AUTO: 0.71 X 10*3/UL (ref 0.2–1)
MONOCYTES NFR BLD AUTO: 9.7 %
NEUTROPHILS # BLD AUTO: 2.47 X 10*3/UL (ref 1.8–7.7)
NEUTROPHILS NFR BLD AUTO: 33.9 %
NRBC BLD AUTO-RTO: 0 X 10*3/UL (ref 0–0.01)
PLATELET # BLD AUTO: 199 X 10*3/UL (ref 140–440)
POTASSIUM SERPL-SCNC: 4.3 MMOL/L (ref 3.5–5.5)
PROT SERPL-MCNC: 7.5 D/DL (ref 6.2–8.2)
SODIUM SERPL-SCNC: 140 MMOL/L (ref 135–145)
TRIGL SERPL-MCNC: 252 MG/DL (ref 0–149)
URATE SERPL-MCNC: 6.5 MG/DL (ref 3.7–8.7)
VLDLC SERPL CALC-MCNC: 50.4 MG/DL (ref 5–40)
WBC # BLD AUTO: 7.31 X 10*3/UL (ref 4.5–10)